# Patient Record
Sex: FEMALE | Race: WHITE | Employment: STUDENT | URBAN - METROPOLITAN AREA
[De-identification: names, ages, dates, MRNs, and addresses within clinical notes are randomized per-mention and may not be internally consistent; named-entity substitution may affect disease eponyms.]

---

## 2019-07-30 ENCOUNTER — OFFICE VISIT (OUTPATIENT)
Dept: PRIMARY CARE CLINIC | Age: 24
End: 2019-07-30
Payer: COMMERCIAL

## 2019-07-30 VITALS
OXYGEN SATURATION: 96 % | BODY MASS INDEX: 26.31 KG/M2 | WEIGHT: 168 LBS | DIASTOLIC BLOOD PRESSURE: 72 MMHG | SYSTOLIC BLOOD PRESSURE: 104 MMHG | HEART RATE: 93 BPM | TEMPERATURE: 97.9 F

## 2019-07-30 DIAGNOSIS — J01.00 ACUTE NON-RECURRENT MAXILLARY SINUSITIS: ICD-10-CM

## 2019-07-30 DIAGNOSIS — R05.9 COUGH: ICD-10-CM

## 2019-07-30 DIAGNOSIS — Z87.440 HISTORY OF RECURRENT UTIS: ICD-10-CM

## 2019-07-30 DIAGNOSIS — F41.9 ANXIETY: ICD-10-CM

## 2019-07-30 DIAGNOSIS — K21.9 GASTROESOPHAGEAL REFLUX DISEASE WITHOUT ESOPHAGITIS: Primary | ICD-10-CM

## 2019-07-30 PROCEDURE — 99214 OFFICE O/P EST MOD 30 MIN: CPT | Performed by: FAMILY MEDICINE

## 2019-07-30 RX ORDER — DAPSONE 75 MG/G
GEL TOPICAL
Refills: 4 | COMMUNITY
Start: 2019-06-24 | End: 2020-02-19 | Stop reason: CLARIF

## 2019-07-30 RX ORDER — AZITHROMYCIN 250 MG/1
TABLET, FILM COATED ORAL
Qty: 1 PACKET | Refills: 0 | Status: SHIPPED | OUTPATIENT
Start: 2019-07-30 | End: 2019-09-07

## 2019-07-30 RX ORDER — DEXLANSOPRAZOLE 60 MG/1
60 CAPSULE, DELAYED RELEASE ORAL DAILY
Qty: 30 CAPSULE | Refills: 5 | Status: SHIPPED | OUTPATIENT
Start: 2019-07-30

## 2019-07-30 RX ORDER — PREDNISONE 1 MG/1
TABLET ORAL
Qty: 30 TABLET | Refills: 0 | Status: SHIPPED | OUTPATIENT
Start: 2019-07-30 | End: 2019-09-07

## 2019-07-30 RX ORDER — SERTRALINE HYDROCHLORIDE 100 MG/1
TABLET, FILM COATED ORAL
Qty: 30 TABLET | Refills: 5 | Status: SHIPPED
Start: 2019-07-30 | End: 2020-08-10 | Stop reason: SDUPTHER

## 2019-07-30 ASSESSMENT — PATIENT HEALTH QUESTIONNAIRE - PHQ9
SUM OF ALL RESPONSES TO PHQ QUESTIONS 1-9: 0
2. FEELING DOWN, DEPRESSED OR HOPELESS: 0
1. LITTLE INTEREST OR PLEASURE IN DOING THINGS: 0
SUM OF ALL RESPONSES TO PHQ QUESTIONS 1-9: 0
SUM OF ALL RESPONSES TO PHQ9 QUESTIONS 1 & 2: 0

## 2019-07-30 ASSESSMENT — ENCOUNTER SYMPTOMS
EYES NEGATIVE: 1
GASTROINTESTINAL NEGATIVE: 1
COUGH: 1
ALLERGIC/IMMUNOLOGIC NEGATIVE: 1
SINUS PRESSURE: 1
SINUS PAIN: 1

## 2019-08-29 PROBLEM — R05.9 COUGH: Status: RESOLVED | Noted: 2019-07-30 | Resolved: 2019-08-29

## 2019-09-07 ENCOUNTER — HOSPITAL ENCOUNTER (EMERGENCY)
Age: 24
Discharge: HOME | End: 2019-09-07
Payer: COMMERCIAL

## 2019-09-07 ENCOUNTER — HOSPITAL ENCOUNTER (EMERGENCY)
Dept: GENERAL RADIOLOGY | Age: 24
Discharge: HOME | End: 2019-09-07
Payer: COMMERCIAL

## 2019-09-07 VITALS
OXYGEN SATURATION: 98 % | TEMPERATURE: 97.6 F | WEIGHT: 165 LBS | SYSTOLIC BLOOD PRESSURE: 119 MMHG | RESPIRATION RATE: 16 BRPM | BODY MASS INDEX: 25.9 KG/M2 | HEART RATE: 78 BPM | HEIGHT: 67 IN | DIASTOLIC BLOOD PRESSURE: 72 MMHG

## 2019-09-07 DIAGNOSIS — S93.401A SPRAIN OF RIGHT ANKLE, UNSPECIFIED LIGAMENT, INITIAL ENCOUNTER: Primary | ICD-10-CM

## 2019-09-07 PROCEDURE — 73630 X-RAY EXAM OF FOOT: CPT

## 2019-09-07 PROCEDURE — 73610 X-RAY EXAM OF ANKLE: CPT

## 2019-09-07 PROCEDURE — 99213 OFFICE O/P EST LOW 20 MIN: CPT

## 2019-09-07 PROCEDURE — 99203 OFFICE O/P NEW LOW 30 MIN: CPT | Performed by: NURSE PRACTITIONER

## 2019-09-07 ASSESSMENT — PAIN SCALES - GENERAL: PAINLEVEL_OUTOF10: 7

## 2019-09-07 ASSESSMENT — PAIN DESCRIPTION - DESCRIPTORS: DESCRIPTORS: ACHING;SORE

## 2019-09-07 ASSESSMENT — ENCOUNTER SYMPTOMS
GASTROINTESTINAL NEGATIVE: 1
COUGH: 0
EYE ITCHING: 0
SHORTNESS OF BREATH: 0
EYE REDNESS: 0
WHEEZING: 0
ALLERGIC/IMMUNOLOGIC NEGATIVE: 1
EYE PAIN: 0
CHEST TIGHTNESS: 0

## 2019-09-07 ASSESSMENT — PAIN DESCRIPTION - PROGRESSION: CLINICAL_PROGRESSION: GRADUALLY WORSENING

## 2019-09-07 ASSESSMENT — PAIN DESCRIPTION - LOCATION: LOCATION: ANKLE

## 2019-09-07 ASSESSMENT — PAIN DESCRIPTION - FREQUENCY: FREQUENCY: CONTINUOUS

## 2019-09-07 ASSESSMENT — PAIN DESCRIPTION - PAIN TYPE: TYPE: ACUTE PAIN

## 2019-09-07 ASSESSMENT — PAIN DESCRIPTION - ONSET: ONSET: SUDDEN

## 2019-09-07 ASSESSMENT — PAIN - FUNCTIONAL ASSESSMENT: PAIN_FUNCTIONAL_ASSESSMENT: PREVENTS OR INTERFERES SOME ACTIVE ACTIVITIES AND ADLS

## 2019-09-07 NOTE — ED TRIAGE NOTES
Pt to [facility] ambulatory with right ankle pain. Pt fell last night. Pt has bruising and swelling to right ankle area.

## 2019-09-07 NOTE — ED PROVIDER NOTES
[address]       Chief Complaint   Patient presents with    Ankle Pain     Right       Nurses Notes reviewed and I agree except as noted in the HPI. HISTORY OF PRESENT ILLNESS   Tahir Charles is a 25 y.o. female who presents The history is provided by the patient. Ankle Problem   Location:  Ankle  Injury: yes    Mechanism of injury: fall    Fall:     Fall occurred: ankle gave way when walking. was wearing wedge shoe. had immedate pain and swelling in the right ankle, and right mid foot. Ankle location:  R ankle  Pain details:     Quality:  Aching, sharp, shooting and throbbing    Radiates to:  Does not radiate    Severity:  Moderate    Onset quality:  Sudden    Duration:  1 day    Timing:  Constant    Progression:  Worsening  Foreign body present:  No foreign bodies  Tetanus status:  Out of date  Prior injury to area:  No  Relieved by:  Nothing  Worsened by: Activity, adduction, abduction, bearing weight and exercise  Ineffective treatments:  None tried  Risk factors: no obesity          REVIEW OF SYSTEMS     Review of Systems   Constitutional: Positive for activity change. HENT: Negative. Eyes: Negative for pain, redness and itching. Respiratory: Negative for cough, chest tightness, shortness of breath and wheezing. Cardiovascular: Negative for chest pain and leg swelling. Gastrointestinal: Negative. Endocrine: Negative for cold intolerance and heat intolerance. Genitourinary: Negative. Musculoskeletal: Positive for arthralgias and myalgias. Skin: Negative. Allergic/Immunologic: Negative. Neurological: Negative for dizziness, light-headedness and headaches. Hematological: Negative for adenopathy. Does not bruise/bleed easily. Psychiatric/Behavioral: Negative. PAST MEDICAL HISTORY   History reviewed. No pertinent past medical history.     SURGICAL HISTORY     Patient  has a past surgical history that

## 2019-09-12 ENCOUNTER — TELEPHONE (OUTPATIENT)
Dept: PRIMARY CARE CLINIC | Age: 24
End: 2019-09-12

## 2019-09-12 NOTE — TELEPHONE ENCOUNTER
Patient does not wish to see anyone at Dr[facility]. Stated they are very rude. Wants to be referred to referred to anyone in Dr[facility] Charles [facility].     Will you please place a referral.

## 2020-02-19 ENCOUNTER — OFFICE VISIT (OUTPATIENT)
Dept: PRIMARY CARE CLINIC | Age: 25
End: 2020-02-19
Payer: COMMERCIAL

## 2020-02-19 ENCOUNTER — HOSPITAL ENCOUNTER (OUTPATIENT)
Age: 25
Discharge: HOME | End: 2020-02-21
Payer: COMMERCIAL

## 2020-02-19 VITALS
DIASTOLIC BLOOD PRESSURE: 78 MMHG | OXYGEN SATURATION: 98 % | HEART RATE: 78 BPM | WEIGHT: 162 LBS | TEMPERATURE: 98.2 F | BODY MASS INDEX: 25.37 KG/M2 | SYSTOLIC BLOOD PRESSURE: 122 MMHG

## 2020-02-19 PROBLEM — R53.83 FATIGUE: Status: ACTIVE | Noted: 2020-02-19

## 2020-02-19 PROBLEM — R61 EXCESSIVE SWEATING: Status: ACTIVE | Noted: 2020-02-19

## 2020-02-19 PROBLEM — R63.1 INCREASED THIRST: Status: ACTIVE | Noted: 2020-02-19

## 2020-02-19 LAB
ALBUMIN SERPL-MCNC: 4.8 G/DL (ref 3.5–5.2)
ALP BLD-CCNC: 55 U/L (ref 35–104)
ALT SERPL-CCNC: 22 U/L (ref 0–32)
ANION GAP SERPL CALCULATED.3IONS-SCNC: 13 MMOL/L (ref 7–16)
AST SERPL-CCNC: 17 U/L (ref 0–31)
BASOPHILS ABSOLUTE: 0.05 E9/L (ref 0–0.2)
BASOPHILS RELATIVE PERCENT: 0.8 % (ref 0–2)
BILIRUB SERPL-MCNC: <0.2 MG/DL (ref 0–1.2)
BUN BLDV-MCNC: 11 MG/DL (ref 6–20)
CALCIUM SERPL-MCNC: 9.7 MG/DL (ref 8.6–10.2)
CHLORIDE BLD-SCNC: 103 MMOL/L (ref 98–107)
CO2: 23 MMOL/L (ref 22–29)
CREAT SERPL-MCNC: 0.8 MG/DL (ref 0.5–1)
EOSINOPHILS ABSOLUTE: 0.19 E9/L (ref 0.05–0.5)
EOSINOPHILS RELATIVE PERCENT: 2.9 % (ref 0–6)
GFR AFRICAN AMERICAN: >60
GFR NON-AFRICAN AMERICAN: >60 ML/MIN/1.73
GLUCOSE BLD-MCNC: 112 MG/DL (ref 74–99)
HCT VFR BLD CALC: 42.5 % (ref 34–48)
HEMOGLOBIN: 14 G/DL (ref 11.5–15.5)
IMMATURE GRANULOCYTES #: 0.02 E9/L
IMMATURE GRANULOCYTES %: 0.3 % (ref 0–5)
LYMPHOCYTES ABSOLUTE: 2.23 E9/L (ref 1.5–4)
LYMPHOCYTES RELATIVE PERCENT: 33.9 % (ref 20–42)
MCH RBC QN AUTO: 29.4 PG (ref 26–35)
MCHC RBC AUTO-ENTMCNC: 32.9 % (ref 32–34.5)
MCV RBC AUTO: 89.3 FL (ref 80–99.9)
MONOCYTES ABSOLUTE: 0.51 E9/L (ref 0.1–0.95)
MONOCYTES RELATIVE PERCENT: 7.8 % (ref 2–12)
NEUTROPHILS ABSOLUTE: 3.57 E9/L (ref 1.8–7.3)
NEUTROPHILS RELATIVE PERCENT: 54.3 % (ref 43–80)
PDW BLD-RTO: 11.8 FL (ref 11.5–15)
PLATELET # BLD: 360 E9/L (ref 130–450)
PMV BLD AUTO: 9.7 FL (ref 7–12)
POTASSIUM SERPL-SCNC: 4 MMOL/L (ref 3.5–5)
RBC # BLD: 4.76 E12/L (ref 3.5–5.5)
SODIUM BLD-SCNC: 139 MMOL/L (ref 132–146)
T4 TOTAL: 8.8 MCG/DL (ref 4.5–11.7)
TOTAL PROTEIN: 7.4 G/DL (ref 6.4–8.3)
TSH SERPL DL<=0.05 MIU/L-ACNC: 0.79 UIU/ML (ref 0.27–4.2)
WBC # BLD: 6.6 E9/L (ref 4.5–11.5)

## 2020-02-19 PROCEDURE — 84443 ASSAY THYROID STIM HORMONE: CPT

## 2020-02-19 PROCEDURE — 80053 COMPREHEN METABOLIC PANEL: CPT

## 2020-02-19 PROCEDURE — 36415 COLL VENOUS BLD VENIPUNCTURE: CPT

## 2020-02-19 PROCEDURE — 86665 EPSTEIN-BARR CAPSID VCA: CPT

## 2020-02-19 PROCEDURE — 99214 OFFICE O/P EST MOD 30 MIN: CPT | Performed by: FAMILY MEDICINE

## 2020-02-19 PROCEDURE — 86664 EPSTEIN-BARR NUCLEAR ANTIGEN: CPT

## 2020-02-19 PROCEDURE — 86663 EPSTEIN-BARR ANTIBODY: CPT

## 2020-02-19 PROCEDURE — 85025 COMPLETE CBC W/AUTO DIFF WBC: CPT

## 2020-02-19 PROCEDURE — 84436 ASSAY OF TOTAL THYROXINE: CPT

## 2020-02-19 ASSESSMENT — ENCOUNTER SYMPTOMS
EYES NEGATIVE: 1
ALLERGIC/IMMUNOLOGIC NEGATIVE: 1
RESPIRATORY NEGATIVE: 1
GASTROINTESTINAL NEGATIVE: 1

## 2020-02-19 ASSESSMENT — PATIENT HEALTH QUESTIONNAIRE - PHQ9
SUM OF ALL RESPONSES TO PHQ9 QUESTIONS 1 & 2: 2
2. FEELING DOWN, DEPRESSED OR HOPELESS: 1
SUM OF ALL RESPONSES TO PHQ QUESTIONS 1-9: 2
1. LITTLE INTEREST OR PLEASURE IN DOING THINGS: 1
SUM OF ALL RESPONSES TO PHQ QUESTIONS 1-9: 2

## 2020-02-19 NOTE — PROGRESS NOTES
dexlansoprazole (DEXILANT) 60 MG CPDR delayed release capsule, Take 1 capsule by mouth daily, Disp: 30 capsule, Rfl: 5    Allergies   Allergen Reactions    Nickel        Social History     Tobacco Use    Smoking status: Never Smoker    Smokeless tobacco: Never Used   Substance Use Topics    Alcohol use: Yes     Comment: socially    Drug use: No      Past Surgical History:   Procedure Laterality Date    COLONOSCOPY      FINGER SURGERY Right 05/2008    PINKY FINGER    FINGER SURGERY Right 05/2013    MIDDLE FINGER    UPPER GASTROINTESTINAL ENDOSCOPY      and lower     Family History   Problem Relation Age of Onset    Cancer Father         SKIN CA    Brain Cancer Other         GRANDMOTHER - BRAIN TUMOR     No past medical history on file. Vitals:    02/19/20 1514   BP: 122/78   Pulse: 78   Temp: 98.2 °F (36.8 °C)   SpO2: 98%   Weight: 162 lb (73.5 kg)     BP Readings from Last 3 Encounters:   02/19/20 122/78   09/07/19 119/72   07/30/19 104/72        Physical Exam  Vitals signs and nursing note reviewed. Constitutional:       Appearance: She is well-developed. HENT:      Head: Normocephalic. Right Ear: External ear normal.      Left Ear: External ear normal.      Nose: Nose normal.   Eyes:      Conjunctiva/sclera: Conjunctivae normal.      Pupils: Pupils are equal, round, and reactive to light. Neck:      Musculoskeletal: Normal range of motion and neck supple. Cardiovascular:      Rate and Rhythm: Normal rate. Pulmonary:      Breath sounds: Normal breath sounds. Abdominal:      General: Bowel sounds are normal.      Palpations: Abdomen is soft. Musculoskeletal: Normal range of motion. Skin:     General: Skin is warm and dry. Neurological:      Mental Status: She is alert and oriented to person, place, and time. Psychiatric:         Behavior: Behavior normal.     Physical exam findings are actually all excellent. No organomegaly noted. Vitals are stable.   We will go ahead with

## 2020-02-21 LAB
EPSTEIN BARR VIRUS NUCLEAR AB IGG: 349 U/ML (ref 0–21.9)
EPSTEIN-BARR EARLY ANTIGEN ANTIBODY: <5 U/ML (ref 0–10.9)
EPSTEIN-BARR VCA IGG: 370 U/ML (ref 0–21.9)
EPSTEIN-BARR VCA IGM: <10 U/ML (ref 0–43.9)

## 2020-02-26 ENCOUNTER — TELEPHONE (OUTPATIENT)
Dept: PRIMARY CARE CLINIC | Age: 25
End: 2020-02-26

## 2020-02-26 ENCOUNTER — OFFICE VISIT (OUTPATIENT)
Dept: PRIMARY CARE CLINIC | Age: 25
End: 2020-02-26
Payer: COMMERCIAL

## 2020-02-26 VITALS
OXYGEN SATURATION: 98 % | TEMPERATURE: 97.8 F | SYSTOLIC BLOOD PRESSURE: 100 MMHG | HEART RATE: 81 BPM | DIASTOLIC BLOOD PRESSURE: 76 MMHG

## 2020-02-26 PROCEDURE — 99214 OFFICE O/P EST MOD 30 MIN: CPT | Performed by: FAMILY MEDICINE

## 2020-02-26 RX ORDER — BUPROPION HYDROCHLORIDE 150 MG/1
150 TABLET ORAL EVERY MORNING
Qty: 30 TABLET | Refills: 3 | Status: SHIPPED
Start: 2020-02-26 | End: 2020-05-18

## 2020-02-26 ASSESSMENT — ENCOUNTER SYMPTOMS
EYES NEGATIVE: 1
ALLERGIC/IMMUNOLOGIC NEGATIVE: 1
GASTROINTESTINAL NEGATIVE: 1
RESPIRATORY NEGATIVE: 1

## 2020-02-26 NOTE — PROGRESS NOTES
BY MOUTH DAILY, Disp: 30 tablet, Rfl: 5    dexlansoprazole (DEXILANT) 60 MG CPDR delayed release capsule, Take 1 capsule by mouth daily, Disp: 30 capsule, Rfl: 5    Allergies   Allergen Reactions    Nickel        Social History     Tobacco Use    Smoking status: Never Smoker    Smokeless tobacco: Never Used   Substance Use Topics    Alcohol use: Yes     Comment: socially    Drug use: No      Past Surgical History:   Procedure Laterality Date    COLONOSCOPY      FINGER SURGERY Right 05/2008    PINKY FINGER    FINGER SURGERY Right 05/2013    MIDDLE FINGER    UPPER GASTROINTESTINAL ENDOSCOPY      and lower     Family History   Problem Relation Age of Onset    Cancer Father         SKIN CA    Brain Cancer Other         GRANDMOTHER - BRAIN TUMOR     No past medical history on file. Vitals:    02/26/20 0800   BP: 100/76   Pulse: 81   Temp: 97.8 °F (36.6 °C)   SpO2: 98%     BP Readings from Last 3 Encounters:   02/26/20 100/76   02/19/20 122/78   09/07/19 119/72        Physical Exam  Vitals signs and nursing note reviewed. Constitutional:       Appearance: She is well-developed. HENT:      Head: Normocephalic. Right Ear: External ear normal.      Left Ear: External ear normal.      Nose: Nose normal.   Eyes:      Conjunctiva/sclera: Conjunctivae normal.      Pupils: Pupils are equal, round, and reactive to light. Neck:      Musculoskeletal: Normal range of motion and neck supple. Cardiovascular:      Rate and Rhythm: Normal rate. Pulmonary:      Breath sounds: Normal breath sounds. Abdominal:      General: Bowel sounds are normal.      Palpations: Abdomen is soft. Musculoskeletal: Normal range of motion. Skin:     General: Skin is warm and dry. Neurological:      Mental Status: She is alert and oriented to person, place, and time. Psychiatric:         Behavior: Behavior normal.     Today's vitals physical examination are all stable. Additional x-ray to be completed.   Adjustment with medications. Reassessment 3 weeks            Plan Per Assessment:  Richard Fine was seen today for discuss labs. Diagnoses and all orders for this visit:    Fatigue, unspecified type  -     XR CHEST STANDARD (2 VW); Future  -     XR ABDOMEN (KUB) (SINGLE AP VIEW); Future    Anxiety    Excessive sweating  -     XR CHEST STANDARD (2 VW); Future  -     XR ABDOMEN (KUB) (SINGLE AP VIEW); Future    Other orders  -     buPROPion (WELLBUTRIN XL) 150 MG extended release tablet; Take 1 tablet by mouth every morning            Return in about 3 weeks (around 3/18/2020). Jo Stephenson DO    Note was generated with the assistance of voice recognition software. Document was reviewed however may contain grammatical errors.

## 2020-03-11 ENCOUNTER — TELEPHONE (OUTPATIENT)
Dept: PEDIATRICS CLINIC | Age: 25
End: 2020-03-11

## 2020-03-11 NOTE — TELEPHONE ENCOUNTER
End of Feb was changed from Zoloft to Wellbutrin XL 150mg. Started noticing side effects, eyes shaking, tachycardia, emotional, couldn't focus and had to get sent home from work. Started back on 100 mg of Zoloft on Friday. 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 Okay to continue on Zoloft and okay to cancel appt ?

## 2020-08-10 RX ORDER — SERTRALINE HYDROCHLORIDE 100 MG/1
TABLET, FILM COATED ORAL
Qty: 90 TABLET | Refills: 1 | Status: SHIPPED | OUTPATIENT
Start: 2020-08-10

## 2023-10-16 PROBLEM — R53.83 MALAISE AND FATIGUE: Status: ACTIVE | Noted: 2023-10-16

## 2023-10-16 PROBLEM — R61 UNEXPLAINED NIGHT SWEATS: Status: ACTIVE | Noted: 2023-10-16

## 2023-10-16 PROBLEM — K21.9 GERD (GASTROESOPHAGEAL REFLUX DISEASE): Status: ACTIVE | Noted: 2023-10-16

## 2023-10-16 PROBLEM — R23.3 PETECHIAE: Status: ACTIVE | Noted: 2023-10-16

## 2023-10-16 PROBLEM — F41.9 ANXIETY: Status: ACTIVE | Noted: 2023-10-16

## 2023-10-16 PROBLEM — R53.81 MALAISE AND FATIGUE: Status: ACTIVE | Noted: 2023-10-16

## 2023-10-16 PROBLEM — R87.610 ATYPICAL SQUAMOUS CELL OF UNDETERMINED SIGNIFICANCE OF CERVIX: Status: ACTIVE | Noted: 2023-10-16

## 2023-10-16 PROBLEM — B97.7 HPV IN FEMALE: Status: ACTIVE | Noted: 2023-10-16

## 2023-10-16 RX ORDER — SERTRALINE HYDROCHLORIDE 100 MG/1
100 TABLET, FILM COATED ORAL DAILY
COMMUNITY
End: 2024-04-09 | Stop reason: SDUPTHER

## 2023-10-16 RX ORDER — FLUCONAZOLE 150 MG/1
TABLET ORAL
COMMUNITY
Start: 2023-01-30 | End: 2023-10-17 | Stop reason: WASHOUT

## 2023-10-16 RX ORDER — LEVONORGESTREL/ETHIN.ESTRADIOL 0.1-0.02MG
1 TABLET ORAL DAILY
COMMUNITY
Start: 2022-05-12 | End: 2023-10-17 | Stop reason: WASHOUT

## 2023-10-16 RX ORDER — OMEPRAZOLE 20 MG/1
CAPSULE, DELAYED RELEASE ORAL
COMMUNITY
Start: 2021-09-07

## 2023-10-16 RX ORDER — TRIAMCINOLONE ACETONIDE 1 MG/G
OINTMENT TOPICAL
COMMUNITY
Start: 2023-04-18 | End: 2023-10-17 | Stop reason: WASHOUT

## 2023-10-17 ENCOUNTER — OFFICE VISIT (OUTPATIENT)
Dept: OBSTETRICS AND GYNECOLOGY | Facility: CLINIC | Age: 28
End: 2023-10-17
Payer: COMMERCIAL

## 2023-10-17 VITALS
SYSTOLIC BLOOD PRESSURE: 128 MMHG | WEIGHT: 177 LBS | HEART RATE: 71 BPM | BODY MASS INDEX: 26.83 KG/M2 | DIASTOLIC BLOOD PRESSURE: 77 MMHG | HEIGHT: 68 IN

## 2023-10-17 DIAGNOSIS — Z12.4 SCREENING FOR MALIGNANT NEOPLASM OF CERVIX: Primary | ICD-10-CM

## 2023-10-17 DIAGNOSIS — Z01.411 ENCNTR FOR GYN EXAM (GENERAL) (ROUTINE) W ABNORMAL FINDINGS: ICD-10-CM

## 2023-10-17 DIAGNOSIS — N89.8 VAGINAL DISCHARGE: ICD-10-CM

## 2023-10-17 PROCEDURE — 87800 DETECT AGNT MULT DNA DIREC: CPT | Mod: CMCLAB | Performed by: NURSE PRACTITIONER

## 2023-10-17 PROCEDURE — 1036F TOBACCO NON-USER: CPT | Performed by: NURSE PRACTITIONER

## 2023-10-17 PROCEDURE — 88175 CYTOPATH C/V AUTO FLUID REDO: CPT | Mod: TC,GCY | Performed by: NURSE PRACTITIONER

## 2023-10-17 PROCEDURE — 87661 TRICHOMONAS VAGINALIS AMPLIF: CPT | Mod: 59 | Performed by: NURSE PRACTITIONER

## 2023-10-17 PROCEDURE — 87205 SMEAR GRAM STAIN: CPT | Mod: CMCLAB | Performed by: NURSE PRACTITIONER

## 2023-10-17 PROCEDURE — 99395 PREV VISIT EST AGE 18-39: CPT | Performed by: NURSE PRACTITIONER

## 2023-10-17 PROCEDURE — 87624 HPV HI-RISK TYP POOLED RSLT: CPT | Mod: 59 | Performed by: NURSE PRACTITIONER

## 2023-10-17 PROCEDURE — 87181 SC STD AGAR DILUTION PER AGT: CPT | Performed by: NURSE PRACTITIONER

## 2023-10-17 PROCEDURE — 88141 CYTOPATH C/V INTERPRET: CPT | Performed by: PATHOLOGY

## 2023-10-17 ASSESSMENT — COLUMBIA-SUICIDE SEVERITY RATING SCALE - C-SSRS
2. HAVE YOU ACTUALLY HAD ANY THOUGHTS OF KILLING YOURSELF?: NO
1. IN THE PAST MONTH, HAVE YOU WISHED YOU WERE DEAD OR WISHED YOU COULD GO TO SLEEP AND NOT WAKE UP?: NO
6. HAVE YOU EVER DONE ANYTHING, STARTED TO DO ANYTHING, OR PREPARED TO DO ANYTHING TO END YOUR LIFE?: NO

## 2023-10-17 ASSESSMENT — PATIENT HEALTH QUESTIONNAIRE - PHQ9
SUM OF ALL RESPONSES TO PHQ9 QUESTIONS 1 AND 2: 0
2. FEELING DOWN, DEPRESSED OR HOPELESS: NOT AT ALL
1. LITTLE INTEREST OR PLEASURE IN DOING THINGS: NOT AT ALL

## 2023-10-17 ASSESSMENT — ENCOUNTER SYMPTOMS
NEUROLOGICAL NEGATIVE: 0
CONSTITUTIONAL NEGATIVE: 0
MUSCULOSKELETAL NEGATIVE: 0
HEMATOLOGIC/LYMPHATIC NEGATIVE: 0
CARDIOVASCULAR NEGATIVE: 0
EYES NEGATIVE: 0
ALLERGIC/IMMUNOLOGIC NEGATIVE: 0
RESPIRATORY NEGATIVE: 0
GASTROINTESTINAL NEGATIVE: 0
ENDOCRINE NEGATIVE: 0
PSYCHIATRIC NEGATIVE: 0

## 2023-10-17 NOTE — PROGRESS NOTES
"Sultana Singer is a 28 y.o. who presents today for her annual gynecologic exam without complaints    Concerns with intercourse:  no     Last pap:   2022 ASCUS HPV Positive other  History of abnormal pap: yes - 2021 BENJI 1  HPV vaccine: yes -    Last mammogram: Never  Colon screen: 2017    History of STIs: no    Patient concern for STI: yes - yes    Family history of breast, uterine, ovarian or colon cancer: yes - GF colon      Past medical, surgical, family and social histories reviewed and updated as needed.    /77   Pulse 71   Ht 1.727 m (5' 8\")   Wt 80.3 kg (177 lb)   BMI 26.91 kg/m²      Physical Exam  Constitutional:       Appearance: Normal appearance.   Genitourinary:      Bladder and rectum normal.      Right Labia: No skin changes or Bartholin's cyst.     Left Labia: No skin changes or Bartholin's cyst.     Vulva exam comments: Normal.      No vaginal prolapse present.     No vaginal atrophy present.     Vaginal exam comments: Normal.      No cervical motion tenderness.      No IUD strings visualized (Cut to just inside os).      Cervical exam comments: Normal.   Breasts:     Breasts are soft.     Right: Normal.      Left: Normal.   HENT:      Head: Normocephalic.   Pulmonary:      Effort: Pulmonary effort is normal.   Abdominal:      General: There is no distension.      Palpations: Abdomen is soft.   Musculoskeletal:         General: Normal range of motion.      Cervical back: Normal range of motion and neck supple.   Neurological:      General: No focal deficit present.      Mental Status: She is alert and oriented to person, place, and time.   Skin:     General: Skin is warm and dry.   Psychiatric:         Mood and Affect: Mood normal.         Behavior: Behavior normal.         Thought Content: Thought content normal.         Judgment: Judgment normal.   Vitals and nursing note reviewed.            Diagnoses and all orders for this visit:  Screening for malignant neoplasm of cervix  -     " THINPREP PAP  Encntr for gyn exam (general) (routine) w abnormal findings  Vaginal discharge  -     Vaginitis Gram Stain For Bacterial Vaginosis + Yeast  -     Fungal Culture/Smear

## 2023-10-18 LAB
CLUE CELLS VAG LPF-#/AREA: ABNORMAL /[LPF]
NUGENT SCORE: 1
YEAST VAG WET PREP-#/AREA: PRESENT

## 2023-10-19 DIAGNOSIS — B37.9 YEAST INFECTION: Primary | ICD-10-CM

## 2023-10-19 LAB
C TRACH RRNA SPEC QL NAA+PROBE: NEGATIVE
N GONORRHOEA DNA SPEC QL PROBE+SIG AMP: NEGATIVE
T VAGINALIS RRNA SPEC QL NAA+PROBE: NEGATIVE

## 2023-10-19 RX ORDER — FLUCONAZOLE 150 MG/1
150 TABLET ORAL
Qty: 2 TABLET | Refills: 1 | Status: SHIPPED | OUTPATIENT
Start: 2023-10-19

## 2023-10-20 ENCOUNTER — PHARMACY VISIT (OUTPATIENT)
Dept: PHARMACY | Facility: CLINIC | Age: 28
End: 2023-10-20
Payer: COMMERCIAL

## 2023-10-20 RX ORDER — FOLLITROPIN 300 [IU]/.36ML
INJECTION, SOLUTION SUBCUTANEOUS
Qty: 0.42 ML | Refills: 0 | OUTPATIENT
Start: 2023-10-20 | End: 2023-10-20

## 2023-10-24 LAB
FUNGUS SPEC CULT: ABNORMAL
FUNGUS SPEC FUNGUS STN: ABNORMAL

## 2023-11-01 LAB
CYTOLOGY CMNT CVX/VAG CYTO-IMP: NORMAL
HPV HR GENOTYPES PNL CVX NAA+PROBE: NEGATIVE
LAB AP CONTRACEPTIVE HISTORY: NORMAL
LAB AP HPV GENOTYPE QUESTION: NO
LAB AP HPV HR: NORMAL
LAB AP PAP ADDITIONAL TESTS: NORMAL
LABORATORY COMMENT REPORT: NORMAL
LMP START DATE: NORMAL
PATH REPORT.TOTAL CANCER: NORMAL

## 2023-11-02 PROBLEM — K21.9 GASTROESOPHAGEAL REFLUX DISEASE WITHOUT ESOPHAGITIS: Status: ACTIVE | Noted: 2019-07-30

## 2024-01-14 NOTE — PROGRESS NOTES
IUD Removal Procedure Note    Type of IUD:   Kyleena  Date of insertion:  unknown  Reason for removal:   recurrent yeast infections, desires OCP  Other relevant history/information:  none    Procedure Details  IUD strings visible:  no  Local anesthesia:  None  Tenaculum used:  None  Removal:  An alligator forceps was entered through the cervical os after the cervix and vagina were prepped.  The IUD was grasped and removed intact.  2 attempt(s) were needed for successful removal.  The patient tolerated the procedure well.    All appropriate instructions regarding removal were reviewed.    Plans for contraception:  oral contraceptives (estrogen/progesterone)    Other follow-up needed:  none    The patient was advised to call for any fever or for prolonged or severe pain or bleeding. She was advised to use OTC ibuprofen as needed for mild to moderate pain.     NARDA Sapp

## 2024-01-15 ENCOUNTER — OFFICE VISIT (OUTPATIENT)
Dept: OBSTETRICS AND GYNECOLOGY | Facility: CLINIC | Age: 29
End: 2024-01-15
Payer: COMMERCIAL

## 2024-01-15 VITALS
HEIGHT: 68 IN | WEIGHT: 175 LBS | DIASTOLIC BLOOD PRESSURE: 79 MMHG | SYSTOLIC BLOOD PRESSURE: 117 MMHG | BODY MASS INDEX: 26.52 KG/M2 | HEART RATE: 73 BPM

## 2024-01-15 DIAGNOSIS — Z30.432 ENCOUNTER FOR IUD REMOVAL: Primary | ICD-10-CM

## 2024-01-15 DIAGNOSIS — Z30.011 BCP (BIRTH CONTROL PILLS) INITIATION: ICD-10-CM

## 2024-01-15 PROCEDURE — 58301 REMOVE INTRAUTERINE DEVICE: CPT | Performed by: ADVANCED PRACTICE MIDWIFE

## 2024-01-15 PROCEDURE — 1036F TOBACCO NON-USER: CPT | Performed by: ADVANCED PRACTICE MIDWIFE

## 2024-01-15 RX ORDER — LEVONORGESTREL/ETHIN.ESTRADIOL 0.1-0.02MG
1 TABLET ORAL DAILY
Qty: 90 TABLET | Refills: 4 | Status: SHIPPED | OUTPATIENT
Start: 2024-01-15 | End: 2024-04-14

## 2024-01-15 ASSESSMENT — ENCOUNTER SYMPTOMS
ALLERGIC/IMMUNOLOGIC NEGATIVE: 0
HEMATOLOGIC/LYMPHATIC NEGATIVE: 0
GASTROINTESTINAL NEGATIVE: 0
EYES NEGATIVE: 0
PSYCHIATRIC NEGATIVE: 0
MUSCULOSKELETAL NEGATIVE: 0
ENDOCRINE NEGATIVE: 0
CARDIOVASCULAR NEGATIVE: 0
RESPIRATORY NEGATIVE: 0
CONSTITUTIONAL NEGATIVE: 0
NEUROLOGICAL NEGATIVE: 0

## 2024-01-15 ASSESSMENT — PAIN SCALES - GENERAL: PAINLEVEL: 0-NO PAIN

## 2024-01-23 ENCOUNTER — APPOINTMENT (OUTPATIENT)
Dept: BEHAVIORAL HEALTH | Facility: CLINIC | Age: 29
End: 2024-01-23
Payer: COMMERCIAL

## 2024-03-29 DIAGNOSIS — B37.9 CANDIDIASIS: Primary | ICD-10-CM

## 2024-03-29 RX ORDER — FLUCONAZOLE 100 MG/1
150 TABLET ORAL DAILY
Qty: 3 TABLET | Refills: 0 | Status: SHIPPED | OUTPATIENT
Start: 2024-03-29 | End: 2024-03-31

## 2024-04-09 ENCOUNTER — TELEMEDICINE (OUTPATIENT)
Dept: BEHAVIORAL HEALTH | Facility: CLINIC | Age: 29
End: 2024-04-09
Payer: COMMERCIAL

## 2024-04-09 DIAGNOSIS — F41.9 ANXIETY: ICD-10-CM

## 2024-04-09 PROCEDURE — 99214 OFFICE O/P EST MOD 30 MIN: CPT | Performed by: PSYCHIATRY & NEUROLOGY

## 2024-04-09 RX ORDER — SERTRALINE HYDROCHLORIDE 100 MG/1
100 TABLET, FILM COATED ORAL DAILY
Qty: 90 TABLET | Refills: 1 | Status: SHIPPED | OUTPATIENT
Start: 2024-04-09

## 2024-04-09 NOTE — PROGRESS NOTES
"Subjective   Patient ID: Yakelin Singer is a 28 y.o. female.    HPI  The encounter diagnosis was Anxiety.      Current Outpatient Medications:     fluconazole (Diflucan) 150 mg tablet, Take 1 tablet (150 mg) by mouth every 3rd day. 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    GERD (gastroesophageal reflux disease)    HPV in female    Malaise and fatigue    Petechiae    Unexplained night sweats    Gastroesophageal reflux disease without esophagitis       Sleep--\"sleeping better\"  -- uses melatonin;  Physical pain--  back pain---  has improved;  Presently engaged in many health related exercise behaviors;  Work--- stressful at certain points prior to 2024;    Busy-- at certain points more than others, but managing;  Mood--  monitoring and insight into changes- whether medical or due to situational stressors;      Review of Systems   Psychiatric/Behavioral:  Negative for dysphoric mood and suicidal ideas.        Objective   Physical Exam  Psychiatric:         Mood and Affect: Mood and affect normal.         Speech: Speech normal.         Behavior: Behavior is not agitated.         Thought Content: Thought content is not paranoid. Thought content does not include homicidal or suicidal ideation.         Assessment/Plan   Diagnoses and all orders for this visit:  Anxiety  -     sertraline (Zoloft) 100 mg tablet; Take 1 tablet (100 mg) by mouth once daily.  -     Follow Up In Psychiatry; Future        "

## 2024-04-09 NOTE — PATIENT INSTRUCTIONS
Keep next scheduled follow up visit;  ---after business hours and on weekends: call [phone] to reach the answering service  ---for appointments and medication refills and any questions or concerns call [phone]  ---if you run out of your medication or need more refills between visits, call our office: [phone]  ---if you need immediate assistance or in case of emergency, dial 911 or go to the nearest emergency room   ---discussed Risks, benefits, side effects and alternative treatments today and came up with a treatment plan

## 2024-04-21 ASSESSMENT — ENCOUNTER SYMPTOMS: DYSPHORIC MOOD: 0

## 2024-07-10 ENCOUNTER — TELEPHONE (OUTPATIENT)
Dept: PRIMARY CARE | Facility: CLINIC | Age: 29
End: 2024-07-10
Payer: COMMERCIAL

## 2024-07-13 ENCOUNTER — OFFICE VISIT (OUTPATIENT)
Dept: DERMATOLOGY | Facility: CLINIC | Age: 29
End: 2024-07-13
Payer: COMMERCIAL

## 2024-07-13 DIAGNOSIS — L30.9 DERMATITIS: ICD-10-CM

## 2024-07-13 DIAGNOSIS — I78.1 NEVUS, NON-NEOPLASTIC: ICD-10-CM

## 2024-07-13 DIAGNOSIS — D48.9 NEOPLASM OF UNCERTAIN BEHAVIOR: Primary | ICD-10-CM

## 2024-07-13 DIAGNOSIS — S90.211A SUBUNGUAL HEMATOMA OF GREAT TOE OF RIGHT FOOT, INITIAL ENCOUNTER: ICD-10-CM

## 2024-07-13 PROCEDURE — 99204 OFFICE O/P NEW MOD 45 MIN: CPT | Performed by: NURSE PRACTITIONER

## 2024-07-13 RX ORDER — TACROLIMUS 1 MG/G
OINTMENT TOPICAL 2 TIMES DAILY
Qty: 60 G | Refills: 11 | Status: SHIPPED | OUTPATIENT
Start: 2024-07-13 | End: 2025-07-13

## 2024-07-13 NOTE — PROGRESS NOTES
Subjective     Yakelin Singer is a 28 y.o. female who presents for the following: Suspicious Skin Lesion (Located left rib cage) and Rash (Nickel Allergy. Uses Tacrolimus for  the flare ups needs refill).     Pt reports lesion on left rib cage was flat and now has become raised and irritated.     Pt questioned regarding discoloration on R great toe nail. She reports it was not there in the beginning of June before her [place] trip and she just removed her nail polish this am and seen the discoloration. She does not recall trauma to the nail     Review of Systems:  No other skin or systemic complaints other than what is documented elsewhere in the note.    The following portions of the chart were reviewed this encounter and updated as appropriate:          Skin Cancer History  No skin cancer on file.      Specialty Problems    None       Objective   Well appearing patient in no apparent distress; mood and affect are within normal limits.    A focused skin examination was performed. All findings within normal limits unless otherwise noted below.    Assessment/Plan   1. Neoplasm of uncertain behavior  Left Flank  Pink elevated papule with irritation measuring 0.4x0.4cm    Specimen 1 - Dermatopathology- DERM LAB  Differential Diagnosis: dermal nevus vs dn vs mm   Check Margins Yes/No?:    Comments:    Dermpath Lab: Routine Histopathology (formalin-fixed tissue)    Discussed bx to r/o DN vs MM  Pt agrees to proceed with bx    2. Nevus, non-neoplastic (5)  Chest (Upper Torso, Anterior), Left Arm, Left Leg, Right Arm, Right Leg  Brown macules with regular boarders and symmetry c/w nevi    All nevi appear normal, no concerning characteristics to warrant a biopsy today     3. Subungual hematoma of great toe of right foot, initial encounter  Right Foot - Anterior  Right great toe with what appears to be a subungal hematoma involving the R medial corner. Pictures obtained today    Pictures obtained today in clinic  Pt encouraged  to take pictures on her phone. Monitor site for growth over the next couple of weeks. If discoloration is not growing out and continues to expand on the the cuticle/skin pt should return to clinic for nail biopsy.     4. Dermatitis  Left Axilla, Right Axilla  No erythematous patches on exam today    Pt reports patches develop randomly. Pt applies tacrolimus which resolves symptoms. Pt has hx of striae from high potency topical steroid use when she was younger.

## 2024-07-17 LAB
LABORATORY COMMENT REPORT: NORMAL
PATH REPORT.FINAL DX SPEC: NORMAL
PATH REPORT.GROSS SPEC: NORMAL
PATH REPORT.MICROSCOPIC SPEC OTHER STN: NORMAL
PATH REPORT.RELEVANT HX SPEC: NORMAL
PATH REPORT.TOTAL CANCER: NORMAL

## 2024-07-24 NOTE — RESULT ENCOUNTER NOTE
"Patient had seen/read \"Result Notes\".  Left a VM with results of shave biopsy of Left Flank: Dermal Nevus.  Per Sanjuanita TOMLIN-CNP results are benign and no further treatment is necessary.  I did, however, ask if she has seen growth of her toe nail to please give me a call back on the nurse line; gave patient number."

## 2024-07-25 NOTE — RESULT ENCOUNTER NOTE
Patient returned call and left a VM.  Patient stated that she understood benign results and informed us that she has had no changes with her toe nail.  No concerns at this time was noted by patient.

## 2024-09-09 ENCOUNTER — APPOINTMENT (OUTPATIENT)
Dept: PRIMARY CARE | Facility: CLINIC | Age: 29
End: 2024-09-09
Payer: COMMERCIAL

## 2024-09-24 ENCOUNTER — APPOINTMENT (OUTPATIENT)
Dept: BEHAVIORAL HEALTH | Facility: CLINIC | Age: 29
End: 2024-09-24
Payer: COMMERCIAL

## 2024-10-25 ENCOUNTER — APPOINTMENT (OUTPATIENT)
Dept: PRIMARY CARE | Facility: CLINIC | Age: 29
End: 2024-10-25
Payer: COMMERCIAL

## 2024-10-25 ENCOUNTER — LAB (OUTPATIENT)
Dept: LAB | Facility: LAB | Age: 29
End: 2024-10-25
Payer: COMMERCIAL

## 2024-10-25 VITALS
RESPIRATION RATE: 18 BRPM | SYSTOLIC BLOOD PRESSURE: 120 MMHG | DIASTOLIC BLOOD PRESSURE: 87 MMHG | OXYGEN SATURATION: 98 % | WEIGHT: 178 LBS | BODY MASS INDEX: 26.98 KG/M2 | HEIGHT: 68 IN | HEART RATE: 89 BPM | TEMPERATURE: 98 F

## 2024-10-25 DIAGNOSIS — F41.9 ANXIETY: ICD-10-CM

## 2024-10-25 DIAGNOSIS — Z00.00 HEALTH CARE MAINTENANCE: ICD-10-CM

## 2024-10-25 DIAGNOSIS — R61 UNEXPLAINED NIGHT SWEATS: ICD-10-CM

## 2024-10-25 DIAGNOSIS — E55.9 VITAMIN D DEFICIENCY: ICD-10-CM

## 2024-10-25 DIAGNOSIS — Z00.00 HEALTH CARE MAINTENANCE: Primary | ICD-10-CM

## 2024-10-25 DIAGNOSIS — K21.9 GASTROESOPHAGEAL REFLUX DISEASE, UNSPECIFIED WHETHER ESOPHAGITIS PRESENT: ICD-10-CM

## 2024-10-25 LAB
25(OH)D3 SERPL-MCNC: 87 NG/ML (ref 30–100)
ALBUMIN SERPL BCP-MCNC: 4.7 G/DL (ref 3.4–5)
ALP SERPL-CCNC: 47 U/L (ref 33–110)
ALT SERPL W P-5'-P-CCNC: 19 U/L (ref 7–45)
ANION GAP SERPL CALC-SCNC: 14 MMOL/L (ref 10–20)
AST SERPL W P-5'-P-CCNC: 15 U/L (ref 9–39)
BASOPHILS # BLD AUTO: 0.04 X10*3/UL (ref 0–0.1)
BASOPHILS NFR BLD AUTO: 0.6 %
BILIRUB SERPL-MCNC: 0.6 MG/DL (ref 0–1.2)
BUN SERPL-MCNC: 13 MG/DL (ref 6–23)
CALCIUM SERPL-MCNC: 10.2 MG/DL (ref 8.6–10.6)
CHLORIDE SERPL-SCNC: 103 MMOL/L (ref 98–107)
CHOLEST SERPL-MCNC: 213 MG/DL (ref 0–199)
CHOLESTEROL/HDL RATIO: 3
CO2 SERPL-SCNC: 26 MMOL/L (ref 21–32)
CREAT SERPL-MCNC: 0.82 MG/DL (ref 0.5–1.05)
EGFRCR SERPLBLD CKD-EPI 2021: >90 ML/MIN/1.73M*2
EOSINOPHIL # BLD AUTO: 0.14 X10*3/UL (ref 0–0.7)
EOSINOPHIL NFR BLD AUTO: 2.2 %
ERYTHROCYTE [DISTWIDTH] IN BLOOD BY AUTOMATED COUNT: 12.2 % (ref 11.5–14.5)
EST. AVERAGE GLUCOSE BLD GHB EST-MCNC: 85 MG/DL
GLUCOSE SERPL-MCNC: 89 MG/DL (ref 74–99)
HBA1C MFR BLD: 4.6 %
HCT VFR BLD AUTO: 44.6 % (ref 36–46)
HDLC SERPL-MCNC: 70.9 MG/DL
HGB BLD-MCNC: 14.3 G/DL (ref 12–16)
IMM GRANULOCYTES # BLD AUTO: 0.02 X10*3/UL (ref 0–0.7)
IMM GRANULOCYTES NFR BLD AUTO: 0.3 % (ref 0–0.9)
LDLC SERPL CALC-MCNC: 125 MG/DL
LYMPHOCYTES # BLD AUTO: 1.89 X10*3/UL (ref 1.2–4.8)
LYMPHOCYTES NFR BLD AUTO: 29.5 %
MCH RBC QN AUTO: 29 PG (ref 26–34)
MCHC RBC AUTO-ENTMCNC: 32.1 G/DL (ref 32–36)
MCV RBC AUTO: 91 FL (ref 80–100)
MONOCYTES # BLD AUTO: 0.37 X10*3/UL (ref 0.1–1)
MONOCYTES NFR BLD AUTO: 5.8 %
NEUTROPHILS # BLD AUTO: 3.95 X10*3/UL (ref 1.2–7.7)
NEUTROPHILS NFR BLD AUTO: 61.6 %
NON HDL CHOLESTEROL: 142 MG/DL (ref 0–149)
NRBC BLD-RTO: 0 /100 WBCS (ref 0–0)
PLATELET # BLD AUTO: 401 X10*3/UL (ref 150–450)
POC APPEARANCE, URINE: CLEAR
POC BILIRUBIN, URINE: NEGATIVE
POC BLOOD, URINE: NEGATIVE
POC COLOR, URINE: YELLOW
POC GLUCOSE, URINE: NEGATIVE MG/DL
POC KETONES, URINE: NEGATIVE MG/DL
POC LEUKOCYTES, URINE: NEGATIVE
POC NITRITE,URINE: NEGATIVE
POC PH, URINE: 7 PH
POC PROTEIN, URINE: NEGATIVE MG/DL
POC SPECIFIC GRAVITY, URINE: 1.01
POC UROBILINOGEN, URINE: 0.2 EU/DL
POTASSIUM SERPL-SCNC: 4.6 MMOL/L (ref 3.5–5.3)
PROT SERPL-MCNC: 7.6 G/DL (ref 6.4–8.2)
RBC # BLD AUTO: 4.93 X10*6/UL (ref 4–5.2)
SODIUM SERPL-SCNC: 138 MMOL/L (ref 136–145)
TRIGL SERPL-MCNC: 88 MG/DL (ref 0–149)
TSH SERPL-ACNC: 1.09 MIU/L (ref 0.44–3.98)
VLDL: 18 MG/DL (ref 0–40)
WBC # BLD AUTO: 6.4 X10*3/UL (ref 4.4–11.3)

## 2024-10-25 PROCEDURE — 80053 COMPREHEN METABOLIC PANEL: CPT

## 2024-10-25 PROCEDURE — 36415 COLL VENOUS BLD VENIPUNCTURE: CPT

## 2024-10-25 PROCEDURE — 80061 LIPID PANEL: CPT

## 2024-10-25 PROCEDURE — 82306 VITAMIN D 25 HYDROXY: CPT

## 2024-10-25 PROCEDURE — 84443 ASSAY THYROID STIM HORMONE: CPT

## 2024-10-25 PROCEDURE — 85025 COMPLETE CBC W/AUTO DIFF WBC: CPT

## 2024-10-25 PROCEDURE — 1036F TOBACCO NON-USER: CPT | Performed by: FAMILY MEDICINE

## 2024-10-25 PROCEDURE — 3008F BODY MASS INDEX DOCD: CPT | Performed by: FAMILY MEDICINE

## 2024-10-25 PROCEDURE — 81002 URINALYSIS NONAUTO W/O SCOPE: CPT | Performed by: FAMILY MEDICINE

## 2024-10-25 PROCEDURE — 83036 HEMOGLOBIN GLYCOSYLATED A1C: CPT

## 2024-10-25 PROCEDURE — 99395 PREV VISIT EST AGE 18-39: CPT | Performed by: FAMILY MEDICINE

## 2024-10-25 RX ORDER — SERTRALINE HYDROCHLORIDE 100 MG/1
100 TABLET, FILM COATED ORAL DAILY
Qty: 90 TABLET | Refills: 3 | Status: SHIPPED | OUTPATIENT
Start: 2024-10-25

## 2024-10-25 ASSESSMENT — PATIENT HEALTH QUESTIONNAIRE - PHQ9
SUM OF ALL RESPONSES TO PHQ9 QUESTIONS 1 AND 2: 0
1. LITTLE INTEREST OR PLEASURE IN DOING THINGS: NOT AT ALL
2. FEELING DOWN, DEPRESSED OR HOPELESS: NOT AT ALL

## 2024-10-25 ASSESSMENT — ENCOUNTER SYMPTOMS
SHORTNESS OF BREATH: 0
HEADACHES: 0

## 2024-10-25 NOTE — PROGRESS NOTES
"Luis Singer \"Yakelin\" is a 29 y.o. female who presents for Annual Exam.    HPI     Pt is here today for annual well exam.    She is new to me.  She is a pharmacist for .      She gets her paps yearly through  GYN, hx of abnormal pap.     She reports history of episodic night sweats which have been present for years.  She takes OCP through her GYN.  No unintentional weight loss, fevers, chills.      Review of Systems   Respiratory:  Negative for shortness of breath.    Cardiovascular:  Negative for chest pain.   Neurological:  Negative for headaches.       Objective     Vitals:    10/25/24 1141   BP: 120/87   BP Location: Left arm   Patient Position: Sitting   Pulse: 89   Resp: 18   Temp: 36.7 °C (98 °F)   TempSrc: Temporal   SpO2: 98%   Weight: 80.7 kg (178 lb)   Height: 1.727 m (5' 8\")        Current Outpatient Medications   Medication Instructions    levonorgestreL-ethinyl estrad (Larissia) 0.1-20 mg-mcg tablet 1 tablet, oral, Daily    omeprazole (PriLOSEC) 20 mg DR capsule Omeprazole 20 MG Oral Capsule Delayed Release  Refills: 0  Start: 7-Sep-2021    sertraline (ZOLOFT) 100 mg, oral, Daily    tacrolimus (Protopic) 0.1 % ointment Topical, 2 times daily        No Known Allergies     Past Surgical History:   Procedure Laterality Date    OTHER SURGICAL HISTORY  09/07/2021    Finger surgical procedure    TONSILLECTOMY          Social History     Tobacco Use    Smoking status: Never    Smokeless tobacco: Never   Vaping Use    Vaping status: Never Used   Substance Use Topics    Alcohol use: Yes     Alcohol/week: 3.0 standard drinks of alcohol     Types: 3 Standard drinks or equivalent per week    Drug use: Never        Family History   Problem Relation Name Age of Onset    No Known Problems Mother      Atrial fibrillation Father      Hypertension Father      Hyperlipidemia Father      Colon cancer Paternal Grandmother      Other (cva) Other grandparent     Diabetes Other grandparent     " Hypertension Other grandparent         Immunization History   Administered Date(s) Administered    Hepatitis A vaccine, age 19 years and greater (HAVRIX) 06/30/2017, 12/28/2017    Influenza, Unspecified 10/13/2015, 09/01/2021, 09/19/2023    Influenza, seasonal, injectable 10/13/2015    Meningococcal ACWY-D (Menactra) 4-valent conjugate vaccine 06/10/2014    Meningococcal MCV4, Unspecified 06/10/2014    Moderna SARS-CoV-2 Vaccination 01/05/2021, 02/02/2021    SARS-CoV-2, Unspecified 01/01/2021, 02/01/2021    Tdap vaccine, age 7 year and older (BOOSTRIX, ADACEL) 12/27/2017        Physical Exam  Vitals reviewed.   Constitutional:       General: She is not in acute distress.     Appearance: Normal appearance. She is not ill-appearing.   HENT:      Head: Normocephalic and atraumatic.      Right Ear: Tympanic membrane, ear canal and external ear normal.      Left Ear: Tympanic membrane, ear canal and external ear normal.      Mouth/Throat:      Mouth: Mucous membranes are moist.      Pharynx: No oropharyngeal exudate or posterior oropharyngeal erythema.   Neck:      Thyroid: No thyroid mass or thyromegaly.   Cardiovascular:      Rate and Rhythm: Normal rate and regular rhythm.      Heart sounds: No murmur heard.  Pulmonary:      Effort: No respiratory distress.      Breath sounds: Normal breath sounds. No wheezing, rhonchi or rales.   Abdominal:      General: There is no distension.      Palpations: Abdomen is soft.      Tenderness: There is no abdominal tenderness.   Lymphadenopathy:      Cervical: No cervical adenopathy.   Skin:     General: Skin is warm and dry.      Findings: No rash.   Neurological:      Mental Status: She is alert and oriented to person, place, and time. Mental status is at baseline.   Psychiatric:         Mood and Affect: Mood normal.         Behavior: Behavior normal.         Assessment & Plan  Health care maintenance  Well Exam - new patient     Pap testing - 10/17/23, ASCUS - sees GYN      Vaccines - tdap utd    Flu vaccine - utd     I recommend yearly flu vaccine and routine COVID vaccinations as indicated     Complete labs    Healthy diet, routine exercise was discussed with patient      Follow up in 1 year or sooner if needed      Orders:    POCT UA (nonautomated) manually resulted    Comprehensive Metabolic Panel; Future    Lipid Panel; Future    CBC and Auto Differential; Future    Hemoglobin A1C; Future    Gastroesophageal reflux disease, unspecified whether esophagitis present  On PPI - controlled        Anxiety  On zoloft - well controlled.  Pt requesting to switch care from psychiatry to me which is fine.    Orders:    sertraline (Zoloft) 100 mg tablet; Take 1 tablet (100 mg) by mouth once daily.    Unexplained night sweats  Referred to ENT.  Pt agreeable.   Orders:    TSH with reflex to Free T4 if abnormal; Future    Referral to Endocrinology; Future    Vitamin D deficiency    Orders:    Vitamin D 25-Hydroxy,Total (for eval of Vitamin D levels); Future

## 2024-10-25 NOTE — ASSESSMENT & PLAN NOTE
Referred to ENT.  Pt agreeable.   Orders:    TSH with reflex to Free T4 if abnormal; Future    Referral to Endocrinology; Future

## 2024-10-25 NOTE — ASSESSMENT & PLAN NOTE
On zoloft - well controlled.  Pt requesting to switch care from psychiatry to me which is fine.    Orders:    sertraline (Zoloft) 100 mg tablet; Take 1 tablet (100 mg) by mouth once daily.

## 2024-11-15 ENCOUNTER — APPOINTMENT (OUTPATIENT)
Dept: DERMATOLOGY | Facility: CLINIC | Age: 29
End: 2024-11-15
Payer: COMMERCIAL

## 2024-12-30 ENCOUNTER — LAB (OUTPATIENT)
Dept: LAB | Facility: LAB | Age: 29
End: 2024-12-30
Payer: COMMERCIAL

## 2024-12-30 DIAGNOSIS — R61 GENERALIZED HYPERHIDROSIS: Primary | ICD-10-CM

## 2024-12-30 DIAGNOSIS — Z00.00 ENCOUNTER FOR GENERAL ADULT MEDICAL EXAMINATION WITHOUT ABNORMAL FINDINGS: ICD-10-CM

## 2024-12-30 PROCEDURE — 83615 LACTATE (LD) (LDH) ENZYME: CPT

## 2024-12-31 LAB — LDH SERPL L TO P-CCNC: 128 U/L (ref 84–246)

## 2025-01-07 ENCOUNTER — APPOINTMENT (OUTPATIENT)
Dept: OBSTETRICS AND GYNECOLOGY | Facility: CLINIC | Age: 30
End: 2025-01-07
Payer: COMMERCIAL

## 2025-01-14 ENCOUNTER — APPOINTMENT (OUTPATIENT)
Dept: OBSTETRICS AND GYNECOLOGY | Facility: CLINIC | Age: 30
End: 2025-01-14
Payer: COMMERCIAL

## 2025-01-14 VITALS
HEIGHT: 68 IN | DIASTOLIC BLOOD PRESSURE: 84 MMHG | SYSTOLIC BLOOD PRESSURE: 120 MMHG | WEIGHT: 177 LBS | BODY MASS INDEX: 26.83 KG/M2

## 2025-01-14 DIAGNOSIS — Z01.419 WELL WOMAN EXAM: Primary | ICD-10-CM

## 2025-01-14 DIAGNOSIS — Z12.4 ENCOUNTER FOR PAPANICOLAOU SMEAR FOR CERVICAL CANCER SCREENING: ICD-10-CM

## 2025-01-14 DIAGNOSIS — Z30.011 BCP (BIRTH CONTROL PILLS) INITIATION: ICD-10-CM

## 2025-01-14 PROCEDURE — 87624 HPV HI-RISK TYP POOLED RSLT: CPT

## 2025-01-14 PROCEDURE — 99395 PREV VISIT EST AGE 18-39: CPT | Performed by: ADVANCED PRACTICE MIDWIFE

## 2025-01-14 PROCEDURE — 1036F TOBACCO NON-USER: CPT | Performed by: ADVANCED PRACTICE MIDWIFE

## 2025-01-14 PROCEDURE — 3008F BODY MASS INDEX DOCD: CPT | Performed by: ADVANCED PRACTICE MIDWIFE

## 2025-01-14 PROCEDURE — 88175 CYTOPATH C/V AUTO FLUID REDO: CPT

## 2025-01-14 RX ORDER — LEVONORGESTREL/ETHIN.ESTRADIOL 0.1-0.02MG
1 TABLET ORAL DAILY
Qty: 84 TABLET | Refills: 3 | Status: SHIPPED | OUTPATIENT
Start: 2025-01-14 | End: 2025-04-14

## 2025-01-14 SDOH — ECONOMIC STABILITY: FOOD INSECURITY: WITHIN THE PAST 12 MONTHS, YOU WORRIED THAT YOUR FOOD WOULD RUN OUT BEFORE YOU GOT MONEY TO BUY MORE.: NEVER TRUE

## 2025-01-14 SDOH — ECONOMIC STABILITY: INCOME INSECURITY: IN THE LAST 12 MONTHS, WAS THERE A TIME WHEN YOU WERE NOT ABLE TO PAY THE MORTGAGE OR RENT ON TIME?: NO

## 2025-01-14 SDOH — ECONOMIC STABILITY: FOOD INSECURITY: WITHIN THE PAST 12 MONTHS, THE FOOD YOU BOUGHT JUST DIDN'T LAST AND YOU DIDN'T HAVE MONEY TO GET MORE.: NEVER TRUE

## 2025-01-14 SDOH — ECONOMIC STABILITY: TRANSPORTATION INSECURITY
IN THE PAST 12 MONTHS, HAS THE LACK OF TRANSPORTATION KEPT YOU FROM MEDICAL APPOINTMENTS OR FROM GETTING MEDICATIONS?: NO

## 2025-01-14 SDOH — ECONOMIC STABILITY: TRANSPORTATION INSECURITY
IN THE PAST 12 MONTHS, HAS LACK OF TRANSPORTATION KEPT YOU FROM MEETINGS, WORK, OR FROM GETTING THINGS NEEDED FOR DAILY LIVING?: NO

## 2025-01-14 ASSESSMENT — SOCIAL DETERMINANTS OF HEALTH (SDOH)

## 2025-01-14 ASSESSMENT — PATIENT HEALTH QUESTIONNAIRE - PHQ9
2. FEELING DOWN, DEPRESSED OR HOPELESS: NOT AT ALL
SUM OF ALL RESPONSES TO PHQ9 QUESTIONS 1 AND 2: 0
1. LITTLE INTEREST OR PLEASURE IN DOING THINGS: NOT AT ALL

## 2025-01-14 NOTE — PROGRESS NOTES
"Assessment/Plan   Problem List Items Addressed This Visit       Well woman exam - Primary    Overview     History  Age of menarche: 18  Last pap: 2023 ASCUS POS  History of abnormal: multiple abnormals with colpo -- no LEEP  Mammogram: No  History of abnormal:  History of STI: HPV  Contraception: Pill -- will consider ring if pap abnormal    Sexual Health  Active with: Male  Pain: No  Lubrication: No  Orgasm: No    Family Cancer History  Breast: No  Ovarian: No  Endometrial: No  Colon: Yes - paternal grandmother age 50    Social  Occupation: Pharmacist @   Lives with: Fianceaimee  Alcohol < 7 drinks per week: Less  Tobacco/vaping:  No    Screening tests age 45 or greater  Colon cancer screening:    Calcium CT Scoring:  ASCVD scoring:  Serum screenings up to date:  DEXA screening:      Safety/Education  Feels safe in home: Yes  Has been forced in sexual activity in last year: No  Calcium/Vitamin D supplementation - Calcium 1,200mg supplement or 300mg dietary per day plus 5,000u per day Vitamin D 3  Importance of adequate sleep: Minimum of 6 hours per night for heart health  Stress reduction/mindfulness:  Working on it                   Other Visit Diagnoses       Encounter for Papanicolaou smear for cervical cancer screening        BCP (birth control pills) initiation                 Sabrina NOHEMI Padilla, APRN-CNM     Luis Weinberg Enmanuel \"Yakelin\" is a 29 y.o. female who is here for a routine exam. Periods are regular every 28-30 days, lasting 4 days. Dysmenorrhea:mild, occurring premenstrually and first 1-2 days of flow. Cyclic symptoms include irritability. No intermenstrual bleeding, spotting, or discharge.    ROS      /84   Ht 1.727 m (5' 8\")   Wt 80.3 kg (177 lb)   LMP 12/24/2024 (Approximate)   BMI 26.91 kg/m²     General:   Alert and oriented x 3   Heart:  Thyroid: Regular rate, rhythm  Euthyroid, normal shape and size   Lungs:  Breast: Clear to auscultation bilaterally  Symmetrical, no skin " changes/nipple discharge, redness, tenderness, no masses palpated bilaterally   Abdomen: Soft, non tender   Vulva: EGBUS normal   Vagina: Pink, normal discharge   Cervix: No CMT   Uterus: Normal shape, size   Adnexa: NT bilaterally       Thank you for coming to see me for your visit today and most importantly thank you for having a preventative visit.  If lab work was sent, you will see your test result via Lineagent  Any prescriptions will be sent electronically to your pharmacy listed    I look forward to seeing you next year for your health care needs.  Please remember:   Sleep is important - make it a priority for at least 6 hours per night!   Exercise such as walking for 20 minutes per day is beneficial to your physical and mental health   Healthy diets can be expensive -- if you every feel like you are struggling to afford healthy food, please let me know as we have a very good program called [employer] that is available to you   Decrease alcohol and tobacco.  A goal of less than 7 alcoholic drinks per week is recommended for risk reduction of breast and colon cancer by 38%!    Be well!  Deb

## 2025-01-29 LAB
CYTOLOGY CMNT CVX/VAG CYTO-IMP: NORMAL
HPV HR GENOTYPES PNL CVX NAA+PROBE: NEGATIVE
LAB AP CONTRACEPTIVE HISTORY: NORMAL
LAB AP HPV GENOTYPE QUESTION: NO
LAB AP HPV HR: NORMAL
LAB AP PREVIOUS ABNORMAL HISTORY: NORMAL
LABORATORY COMMENT REPORT: NORMAL
LMP START DATE: NORMAL
PATH REPORT.TOTAL CANCER: NORMAL

## 2025-05-02 PROCEDURE — 99284 EMERGENCY DEPT VISIT MOD MDM: CPT | Performed by: EMERGENCY MEDICINE

## 2025-05-03 ENCOUNTER — APPOINTMENT (OUTPATIENT)
Dept: RADIOLOGY | Facility: HOSPITAL | Age: 30
End: 2025-05-03
Payer: COMMERCIAL

## 2025-05-03 ENCOUNTER — HOSPITAL ENCOUNTER (EMERGENCY)
Facility: HOSPITAL | Age: 30
Discharge: HOME | End: 2025-05-03
Attending: EMERGENCY MEDICINE
Payer: COMMERCIAL

## 2025-05-03 VITALS
RESPIRATION RATE: 20 BRPM | SYSTOLIC BLOOD PRESSURE: 115 MMHG | DIASTOLIC BLOOD PRESSURE: 69 MMHG | OXYGEN SATURATION: 98 % | HEIGHT: 67 IN | TEMPERATURE: 97 F | BODY MASS INDEX: 26.68 KG/M2 | WEIGHT: 170 LBS | HEART RATE: 63 BPM

## 2025-05-03 DIAGNOSIS — R10.9 LEFT FLANK PAIN: Primary | ICD-10-CM

## 2025-05-03 LAB
ALBUMIN SERPL BCP-MCNC: 4.5 G/DL (ref 3.4–5)
ALP SERPL-CCNC: 40 U/L (ref 33–110)
ALT SERPL W P-5'-P-CCNC: 10 U/L (ref 7–45)
ANION GAP SERPL CALC-SCNC: 13 MMOL/L (ref 10–20)
APPEARANCE UR: ABNORMAL
AST SERPL W P-5'-P-CCNC: 13 U/L (ref 9–39)
B-HCG SERPL-ACNC: <2 MIU/ML
BASOPHILS # BLD AUTO: 0.04 X10*3/UL (ref 0–0.1)
BASOPHILS NFR BLD AUTO: 0.3 %
BILIRUB SERPL-MCNC: 0.4 MG/DL (ref 0–1.2)
BILIRUB UR STRIP.AUTO-MCNC: NEGATIVE MG/DL
BUN SERPL-MCNC: 11 MG/DL (ref 6–23)
CALCIUM SERPL-MCNC: 9.2 MG/DL (ref 8.6–10.3)
CHLORIDE SERPL-SCNC: 102 MMOL/L (ref 98–107)
CO2 SERPL-SCNC: 23 MMOL/L (ref 21–32)
COLOR UR: YELLOW
CREAT SERPL-MCNC: 0.8 MG/DL (ref 0.5–1.05)
EGFRCR SERPLBLD CKD-EPI 2021: >90 ML/MIN/1.73M*2
EOSINOPHIL # BLD AUTO: 0.03 X10*3/UL (ref 0–0.7)
EOSINOPHIL NFR BLD AUTO: 0.2 %
ERYTHROCYTE [DISTWIDTH] IN BLOOD BY AUTOMATED COUNT: 11.8 % (ref 11.5–14.5)
GLUCOSE SERPL-MCNC: 146 MG/DL (ref 74–99)
GLUCOSE UR STRIP.AUTO-MCNC: NORMAL MG/DL
HCT VFR BLD AUTO: 36.5 % (ref 36–46)
HGB BLD-MCNC: 12.4 G/DL (ref 12–16)
IMM GRANULOCYTES # BLD AUTO: 0.05 X10*3/UL (ref 0–0.7)
IMM GRANULOCYTES NFR BLD AUTO: 0.4 % (ref 0–0.9)
KETONES UR STRIP.AUTO-MCNC: NEGATIVE MG/DL
LEUKOCYTE ESTERASE UR QL STRIP.AUTO: NEGATIVE
LYMPHOCYTES # BLD AUTO: 1.23 X10*3/UL (ref 1.2–4.8)
LYMPHOCYTES NFR BLD AUTO: 8.9 %
MCH RBC QN AUTO: 29.3 PG (ref 26–34)
MCHC RBC AUTO-ENTMCNC: 34 G/DL (ref 32–36)
MCV RBC AUTO: 86 FL (ref 80–100)
MONOCYTES # BLD AUTO: 0.57 X10*3/UL (ref 0.1–1)
MONOCYTES NFR BLD AUTO: 4.1 %
MUCOUS THREADS #/AREA URNS AUTO: ABNORMAL /LPF
NEUTROPHILS # BLD AUTO: 11.86 X10*3/UL (ref 1.2–7.7)
NEUTROPHILS NFR BLD AUTO: 86.1 %
NITRITE UR QL STRIP.AUTO: NEGATIVE
NRBC BLD-RTO: 0 /100 WBCS (ref 0–0)
PH UR STRIP.AUTO: 6.5 [PH]
PLATELET # BLD AUTO: 330 X10*3/UL (ref 150–450)
POTASSIUM SERPL-SCNC: 3.6 MMOL/L (ref 3.5–5.3)
PROT SERPL-MCNC: 7 G/DL (ref 6.4–8.2)
PROT UR STRIP.AUTO-MCNC: ABNORMAL MG/DL
RBC # BLD AUTO: 4.23 X10*6/UL (ref 4–5.2)
RBC # UR STRIP.AUTO: ABNORMAL MG/DL
RBC #/AREA URNS AUTO: ABNORMAL /HPF
SODIUM SERPL-SCNC: 134 MMOL/L (ref 136–145)
SP GR UR STRIP.AUTO: 1.02
SQUAMOUS #/AREA URNS AUTO: ABNORMAL /HPF
UROBILINOGEN UR STRIP.AUTO-MCNC: NORMAL MG/DL
WBC # BLD AUTO: 13.8 X10*3/UL (ref 4.4–11.3)
WBC #/AREA URNS AUTO: ABNORMAL /HPF

## 2025-05-03 PROCEDURE — 96374 THER/PROPH/DIAG INJ IV PUSH: CPT

## 2025-05-03 PROCEDURE — 74176 CT ABD & PELVIS W/O CONTRAST: CPT

## 2025-05-03 PROCEDURE — 36415 COLL VENOUS BLD VENIPUNCTURE: CPT

## 2025-05-03 PROCEDURE — 74176 CT ABD & PELVIS W/O CONTRAST: CPT | Performed by: RADIOLOGY

## 2025-05-03 PROCEDURE — 84702 CHORIONIC GONADOTROPIN TEST: CPT

## 2025-05-03 PROCEDURE — 2500000004 HC RX 250 GENERAL PHARMACY W/ HCPCS (ALT 636 FOR OP/ED): Mod: JZ

## 2025-05-03 PROCEDURE — 85025 COMPLETE CBC W/AUTO DIFF WBC: CPT

## 2025-05-03 PROCEDURE — 81001 URINALYSIS AUTO W/SCOPE: CPT

## 2025-05-03 PROCEDURE — 80053 COMPREHEN METABOLIC PANEL: CPT

## 2025-05-03 RX ORDER — ONDANSETRON HYDROCHLORIDE 2 MG/ML
4 INJECTION, SOLUTION INTRAVENOUS ONCE
Status: COMPLETED | OUTPATIENT
Start: 2025-05-03 | End: 2025-05-03

## 2025-05-03 RX ORDER — MORPHINE SULFATE 4 MG/ML
4 INJECTION, SOLUTION INTRAMUSCULAR; INTRAVENOUS ONCE
Status: DISCONTINUED | OUTPATIENT
Start: 2025-05-03 | End: 2025-05-03 | Stop reason: HOSPADM

## 2025-05-03 RX ADMIN — ONDANSETRON 4 MG: 2 INJECTION INTRAMUSCULAR; INTRAVENOUS at 01:31

## 2025-05-03 ASSESSMENT — PAIN SCALES - GENERAL: PAINLEVEL_OUTOF10: 6

## 2025-05-03 ASSESSMENT — LIFESTYLE VARIABLES
HAVE YOU EVER FELT YOU SHOULD CUT DOWN ON YOUR DRINKING: NO
TOTAL SCORE: 0
EVER HAD A DRINK FIRST THING IN THE MORNING TO STEADY YOUR NERVES TO GET RID OF A HANGOVER: NO
EVER FELT BAD OR GUILTY ABOUT YOUR DRINKING: NO
HAVE PEOPLE ANNOYED YOU BY CRITICIZING YOUR DRINKING: NO

## 2025-05-03 ASSESSMENT — COLUMBIA-SUICIDE SEVERITY RATING SCALE - C-SSRS
1. IN THE PAST MONTH, HAVE YOU WISHED YOU WERE DEAD OR WISHED YOU COULD GO TO SLEEP AND NOT WAKE UP?: NO
6. HAVE YOU EVER DONE ANYTHING, STARTED TO DO ANYTHING, OR PREPARED TO DO ANYTHING TO END YOUR LIFE?: NO
2. HAVE YOU ACTUALLY HAD ANY THOUGHTS OF KILLING YOURSELF?: NO

## 2025-05-03 ASSESSMENT — PAIN - FUNCTIONAL ASSESSMENT: PAIN_FUNCTIONAL_ASSESSMENT: 0-10

## 2025-05-03 NOTE — ED PROVIDER NOTES
EMERGENCY DEPARTMENT ENCOUNTER      Pt Name: Sultana Singer  MRN: [id number]  Birthdate 1995  Date of evaluation: 5/2/2025  Provider: Jaime Soares DO    CHIEF COMPLAINT       Chief Complaint   Patient presents with    Flank Pain         HISTORY OF PRESENT ILLNESS    HPI    29-year-old female presenting to the emergency department for evaluation of left flank pain.  Patient states starting at 6 PM she had sudden onset sharp left flank pain with associated nausea and dry heaving but no vomiting.  Patient states the pain is always present however waxes and wanes in intensity.  Denies any hematuria, dysuria, polyuria, urgency.  No diarrhea.  Patient is sexually active with her fiancé.  Denies any vaginal spotting or bleeding.  No vaginal discharge.  Denies chest pain, shortness of breath, cough, nasal congestion and states she is otherwise not been ill.  Although triage complaints as right flank pain during my exam patient states that is her left flank that hurts and denies experiencing any right flank pain at any time.    Nursing Notes were reviewed.    PAST MEDICAL HISTORY   Medical History[1]      SURGICAL HISTORY     Surgical History[2]      CURRENT MEDICATIONS       Discharge Medication List as of 5/3/2025  4:30 AM        CONTINUE these medications which have NOT CHANGED    Details   levonorgestreL-ethinyl estrad (Larissia) 0.1-20 mg-mcg tablet Take 1 tablet by mouth once daily., Starting Tue 1/14/2025, Until Mon 4/14/2025, Normal      omeprazole (PriLOSEC) 20 mg DR capsule Omeprazole 20 MG Oral Capsule Delayed Release  Refills: 0  Start: 7-Sep-2021, Historical Med      sertraline (Zoloft) 100 mg tablet Take 1 tablet (100 mg) by mouth once daily., Starting Fri 10/25/2024, Normal      tacrolimus (Protopic) 0.1 % ointment Apply topically 2 times a day., Starting Sat 7/13/2024, Until Sun 7/13/2025, Normal             ALLERGIES     Nickel    FAMILY HISTORY     Family History[3]       SOCIAL HISTORY      Social History[4]    SCREENINGS                        PHYSICAL EXAM    (up to 7 for level 4, 8 or more for level 5)     ED Triage Vitals [05/03/25 0038]   Temperature Heart Rate Respirations BP   36.1 °C (97 °F) 69 20 130/77      Pulse Ox Temp Source Heart Rate Source Patient Position   98 % Temporal -- --      BP Location FiO2 (%)     -- --       Physical Exam  Vitals and nursing note reviewed.   Constitutional:       General: She is not in acute distress.     Appearance: Normal appearance. She is not ill-appearing, toxic-appearing or diaphoretic.   HENT:      Head: Normocephalic and atraumatic.      Right Ear: External ear normal.      Left Ear: External ear normal.      Nose: Nose normal.      Mouth/Throat:      Pharynx: Oropharynx is clear.   Eyes:      Conjunctiva/sclera: Conjunctivae normal.   Cardiovascular:      Rate and Rhythm: Normal rate and regular rhythm.      Pulses: Normal pulses.      Heart sounds: Normal heart sounds.   Pulmonary:      Effort: Pulmonary effort is normal.      Breath sounds: Normal breath sounds.   Abdominal:      General: There is no distension.      Palpations: There is no mass.      Tenderness: There is no abdominal tenderness. There is left CVA tenderness. There is no right CVA tenderness, guarding or rebound.      Hernia: No hernia is present.   Musculoskeletal:         General: Normal range of motion.      Cervical back: Normal range of motion and neck supple.      Right lower leg: No edema.      Left lower leg: No edema.   Skin:     General: Skin is warm and dry.   Neurological:      General: No focal deficit present.      Mental Status: She is oriented to person, place, and time.   Psychiatric:         Mood and Affect: Mood normal.         Behavior: Behavior normal.          DIAGNOSTIC RESULTS     LABS:  Labs Reviewed   CBC WITH AUTO DIFFERENTIAL - Abnormal       Result Value    WBC 13.8 (*)     nRBC 0.0      RBC 4.23      Hemoglobin 12.4      Hematocrit 36.5      MCV 86       MCH 29.3      MCHC 34.0      RDW 11.8      Platelets 330      Neutrophils % 86.1      Immature Granulocytes %, Automated 0.4      Lymphocytes % 8.9      Monocytes % 4.1      Eosinophils % 0.2      Basophils % 0.3      Neutrophils Absolute 11.86 (*)     Immature Granulocytes Absolute, Automated 0.05      Lymphocytes Absolute 1.23      Monocytes Absolute 0.57      Eosinophils Absolute 0.03      Basophils Absolute 0.04     COMPREHENSIVE METABOLIC PANEL - Abnormal    Glucose 146 (*)     Sodium 134 (*)     Potassium 3.6      Chloride 102      Bicarbonate 23      Anion Gap 13      Urea Nitrogen 11      Creatinine 0.80      eGFR >90      Calcium 9.2      Albumin 4.5      Alkaline Phosphatase 40      Total Protein 7.0      AST 13      Bilirubin, Total 0.4      ALT 10     URINALYSIS WITH REFLEX CULTURE AND MICROSCOPIC - Abnormal    Color, Urine Yellow      Appearance, Urine Turbid (*)     Specific Gravity, Urine 1.023      pH, Urine 6.5      Protein, Urine 10 (TRACE)      Glucose, Urine Normal      Blood, Urine 0.03 (TRACE) (*)     Ketones, Urine NEGATIVE      Bilirubin, Urine NEGATIVE      Urobilinogen, Urine Normal      Nitrite, Urine NEGATIVE      Leukocyte Esterase, Urine NEGATIVE     URINALYSIS MICROSCOPIC WITH REFLEX CULTURE - Abnormal    WBC, Urine 1-5      RBC, Urine 11-20 (*)     Squamous Epithelial Cells, Urine 1-9 (SPARSE)      Mucus, Urine FEW     HUMAN CHORIONIC GONADOTROPIN, SERUM QUANTITATIVE - Normal    HCG, Beta-Quantitative <2      Narrative:      Total HCG measurement is performed using the Bel[employer] Access   Immunoassay which detects intact HCG and free beta HCG subunit.    This test is not indicated for use as a tumor marker.   HCG testing is performed using a different test methodology at [facility] than other [facility]. Direct result comparison   should only be made within the same method.       URINALYSIS WITH REFLEX CULTURE AND MICROSCOPIC    Narrative:     The  following orders were created for panel order Urinalysis with Reflex Culture and Microscopic.  Procedure                               Abnormality         Status                     ---------                               -----------         ------                     Urinalysis with Reflex C...[[id number]]  Abnormal            Final result               Extra Urine Gray Tube[[id number]]                                                         Please view results for these tests on the individual orders.   EXTRA URINE GRAY TUBE       All other labs were within normal range or not returned as of this dictation.    Imaging  CT abdomen pelvis wo IV contrast   Final Result   3.3 cm indeterminate mass in the right hepatic lobe; follow-up   outpatient liver protocol MRI is recommended for further evaluation.        No evidence of renal calculus or hydronephrosis.        2.6 cm left ovarian cyst.        MACRO:   None        Signed by: Sridhar Cruz 5/3/2025 3:53 AM   Dictation workstation:   [id number]           Procedures  Procedures     EMERGENCY DEPARTMENT COURSE/MDM:     Diagnoses as of 05/03/25 0641   Left flank pain        Medical Decision Making    29-year-old female presenting to the emergency department for evaluation of left flank pain.  Hemodynamically stable, no acute distress, nontoxic-appearing, afebrile.  High clinical suspicion for left-sided obstructive kidney stone based off of history physical exam.  Given CVA tenderness could also have superimposed pyelonephritis.  Zofran ordered for nausea as well as morphine for pain however patient's pain resolved prior to receiving morphine and patient declined pain medication.  CBC, CMP, urinalysis and hCG ordered as well as CT abdomen pelvis without IV contrast.  Patient has a leukocytosis with a white blood cell count of 13.8 with microscopic hematuria on urinalysis but no white blood cells, nitrates or LE to suggest UTI. Labs otherwise unremarkable for acute  pathology. CT shows incidental indeterminate mass in the right hepatic lobe as well as a 2.6 cm left ovarian cyst.  No evidence of obstructing kidney stone or other acute pathology.  Patient was reassessed and states she has only mild residual dull pain in her left flank and has not had any recurrence of the severe sharp pain that she experienced prior to arriving to the emergency department.  Given patient's symptoms with microscopic hematuria on urinalysis patient likely passed a small kidney stone prior to CT imaging.  Patient deemed safe for discharge for outpatient follow-up with her primary care physician with strict return precautions.  A referral for the [facility] was placed for follow-up regarding the patient's incidental hepatic mass the patient's incidental CT findings were discussed with her at bedside.    Patient and or family in agreement and understanding of treatment plan.  All questions answered.      I reviewed the case with the attending ED physician. The attending ED physician agrees with the plan. Patient and/or patient´s representative was counseled regarding labs, imaging, likely diagnosis, and plan. All questions were answered.    ED Medications administered this visit:    Medications   ondansetron (Zofran) injection 4 mg (4 mg intravenous Given 5/3/25 0131)       New Prescriptions from this visit:    Discharge Medication List as of 5/3/2025  4:30 AM          Follow-up:  Onur Farfan,   [address]  [address]  [facility] [id number]  [phone]    Call in 1 day      [facility]  [address]  [address]  [facility] [address] [phone]  Call in 1 day          Final Impression:   1. Left flank pain          (Please note that portions of this note were completed with a voice recognition program.  Efforts were made to edit the dictations but occasionally words are mis-transcribed.)       [1]   Past Medical History:  Diagnosis Date    Personal  history of other diseases of the digestive system     History of gastrointestinal disorder    Personal history of other mental and behavioral disorders     History of depression   [2]   Past Surgical History:  Procedure Laterality Date    OTHER SURGICAL HISTORY  09/07/2021    Finger surgical procedure    TONSILLECTOMY     [3]   Family History  Problem Relation Name Age of Onset    No Known Problems Mother      Atrial fibrillation Father      Hypertension Father      Hyperlipidemia Father      Colon cancer Paternal Grandmother      Other (cva) Other grandparent     Diabetes Other grandparent     Hypertension Other grandparent    [4]   Social History  Socioeconomic History    Marital status: Significant Other     Spouse name: Skinny   Occupational History    Occupation: pharmacist,  - fertility -    Tobacco Use    Smoking status: Never    Smokeless tobacco: Never   Vaping Use    Vaping status: Never Used   Substance and Sexual Activity    Alcohol use: Yes     Alcohol/week: 3.0 standard drinks of alcohol     Types: 3 Standard drinks or equivalent per week    Drug use: Never    Sexual activity: Yes     Partners: Male     Birth control/protection: I.U.D.     Social Drivers of Health     Food Insecurity: No Food Insecurity (1/14/2025)    Hunger Vital Sign     Worried About Running Out of Food in the Last Year: Never true     Ran Out of Food in the Last Year: Never true   Transportation Needs: No Transportation Needs (1/14/2025)    PRAPARE - Transportation     Lack of Transportation (Medical): No     Lack of Transportation (Non-Medical): No   Intimate Partner Violence: Not At Risk (1/14/2025)    Humiliation, Afraid, Rape, and Kick questionnaire     Fear of Current or Ex-Partner: No     Emotionally Abused: No     Physically Abused: No     Sexually Abused: No   Housing Stability: Low Risk  (1/14/2025)    Housing Stability Vital Sign     Unable to Pay for Housing in the Last Year: No     Number of Times Moved in the Last  Year: 1     Homeless in the Last Year: No        Jaime Soares DO  Resident  05/03/25 0636

## 2025-05-03 NOTE — DISCHARGE INSTRUCTIONS
Call to schedule follow-up appointment with your primary care physician for reevaluation.  You will also be provided with a referral to the [facility] for diagnostics for follow-up regarding your mass seen on your liver.  Return to the emergency department immediately for any new or worsening symptoms such as fevers, chills, night sweats, worsening pain, burning when you pee, blood in your urine, nausea, vomiting, diarrhea, chest pain or difficulty breathing.

## 2025-05-05 ENCOUNTER — TELEPHONE (OUTPATIENT)
Dept: HEMATOLOGY/ONCOLOGY | Facility: HOSPITAL | Age: 30
End: 2025-05-05
Payer: COMMERCIAL

## 2025-05-05 NOTE — TELEPHONE ENCOUNTER
Referral placed to [facility] by Dr. Cooley from the [place] ED for a 3.3cm right hepatic lob mass, discovered incidentally on 5/3/25 CT imaging. I called the patient and left a voicemail with our contact information. I would advise a liver MRI and virtual visit with our diagnostic clinic after to review results and next steps. Will place orders and discuss with patient once she returns our call.     Syeda TOMLIN-CNP  [facility]

## 2025-05-06 DIAGNOSIS — R16.0 LIVER MASS: Primary | ICD-10-CM

## 2025-05-06 NOTE — TELEPHONE ENCOUNTER
Pt called and agreeable to plan of getting MRI and following up with Diagnostic Clinic.  will reach out to pt for the MRI scheduling. Pt aware of this plan.

## 2025-05-18 ENCOUNTER — APPOINTMENT (OUTPATIENT)
Dept: RADIOLOGY | Facility: HOSPITAL | Age: 30
End: 2025-05-18
Payer: COMMERCIAL

## 2025-05-20 ENCOUNTER — APPOINTMENT (OUTPATIENT)
Dept: HEMATOLOGY/ONCOLOGY | Facility: HOSPITAL | Age: 30
End: 2025-05-20
Payer: COMMERCIAL

## 2025-05-25 ENCOUNTER — APPOINTMENT (OUTPATIENT)
Dept: RADIOLOGY | Facility: HOSPITAL | Age: 30
End: 2025-05-25
Payer: COMMERCIAL

## 2025-05-28 ENCOUNTER — HOSPITAL ENCOUNTER (OUTPATIENT)
Dept: RADIOLOGY | Facility: CLINIC | Age: 30
Discharge: HOME | End: 2025-05-28
Payer: COMMERCIAL

## 2025-05-28 VITALS — WEIGHT: 176.37 LBS | BODY MASS INDEX: 27.62 KG/M2

## 2025-05-28 DIAGNOSIS — R16.0 LIVER MASS: ICD-10-CM

## 2025-05-28 PROCEDURE — 2500000004 HC RX 250 GENERAL PHARMACY W/ HCPCS (ALT 636 FOR OP/ED): Performed by: NURSE PRACTITIONER

## 2025-05-28 PROCEDURE — A9581 GADOXETATE DISODIUM INJ: HCPCS | Performed by: NURSE PRACTITIONER

## 2025-05-28 PROCEDURE — 74183 MRI ABD W/O CNTR FLWD CNTR: CPT

## 2025-05-28 RX ADMIN — GADOXETATE DISODIUM 2 MMOL: 181.43 INJECTION, SOLUTION INTRAVENOUS at 13:54

## 2025-06-04 ENCOUNTER — TELEMEDICINE (OUTPATIENT)
Dept: HEMATOLOGY/ONCOLOGY | Facility: HOSPITAL | Age: 30
End: 2025-06-04
Payer: COMMERCIAL

## 2025-06-04 DIAGNOSIS — R10.9 LEFT FLANK PAIN: ICD-10-CM

## 2025-06-04 PROBLEM — N28.1 RENAL CYST, LEFT: Status: ACTIVE | Noted: 2025-06-04

## 2025-06-04 PROBLEM — D18.03 HEMANGIOMA OF LIVER: Status: ACTIVE | Noted: 2025-06-04

## 2025-06-04 PROCEDURE — 99203 OFFICE O/P NEW LOW 30 MIN: CPT | Performed by: NURSE PRACTITIONER

## 2025-06-04 NOTE — PROGRESS NOTES
[facility]  Diagnostic Clinic  New Patient Visit    Date of Service: 25      Patient Name: Sultana Singer   : 1995  MRN: [id number]   PCP: Onur Farfan DO   Referred by: Dr. Cooley    Problem: 3.3cm right hepatic lobe mass on 5/3/25 CT scan.    HPI: Sultana Singer is a 29 y.o. year old female who presents to [facility] for follow-up on an hepatic mass seen on 5/3/25 CT scan after presenting to the [facility] ED with left flank pain, nausea, and dry heaving. Was found to have 2.6cm left ovarian cyst, hematuria, and leukocytosis possibly attributed to the passing of a small kidney stone.     Today reports resolution of her left flank pain and feels at her baseline health.    Denies fevers, chills, abdominal pain, nausea, vomiting, hematuria, dysuria, changes in appetite, unintentional weightloss.    History of tobacco use:   Denies tobacco and illicit drug use  History of alcohol substance use disorder, stopped drinking in 2025    Personal history of malignancy:   None    Family history of malignancy:   Paternal grandmother - Colon cancer, age 50  Maternal grandmother - Non-Hodgkin Lymphoma, initially diagnosed     Health Maintenance:  Pap Smear 2025 - normal  PCP yearly exam 10/2025    PATHOLOGY:  N/A     IMAGING:  === 25 ===    MR LIVER WITH EOVIST CONTRAST    - Impression -  1.  Hepatic hemangioma corresponding with the CT finding.  2. Additional findings as described above.    I personally reviewed the images/study and I agree with the findings  as stated by Franklin Askew MD (resident) . This study was  interpreted at [place], [place].    MACRO:  None    Signed by: Gregorio Jimenez 2025 7:31 PM  Dictation workstation:   [id number]  === 25 ===    CT ABDOMEN PELVIS WO IV CONTRAST    - Impression -  3.3 cm indeterminate mass in the right hepatic lobe; follow-up  outpatient  liver protocol MRI is recommended for further evaluation.    No evidence of renal calculus or hydronephrosis.    2.6 cm left ovarian cyst.    MACRO:  None    Signed by: Sridhar Cruz 5/3/2025 3:53 AM  Dictation workstation:   [id number]      LABS:  Reviewed labs from 5/3/25    PAST MEDICAL HISTORY:  Medical History[1]    PAST SURGICAL HISTORY:  Surgical History[2]    SOCIAL HISTORY:  Social Connections: Not on file       FAMILY HISTORY:  Family History[3]    MEDICATIONS:  Medications Ordered Prior to Encounter[4]      OBJECTIVE:  There were no vitals taken for this visit.      ASSESSMENT:  Ms. Singer is a 30yo female referred to the [facility] by the [facility] for a liver mass seen on 5/3/25 CT. MRI of the liver on 5/28/25 confirmed mass to be benign liver hemangioma. Left renal cyst incidentally found on MRI. Offered urology follow-up for both renal cyst and recent kidney stone, but patient declined at this time.     PLAN:  No additional work-up needed for liver hemangioma at this time given that patient is asymptomatic.     Continue to follow with PCP and Gyn for health maintenance.     Syeda TOMLIN-ANNI  [facility]    Virtual or Telephone Consent    An interactive audio and video telecommunication system which permits real time communications between the patient (at the originating site) and provider (at the distant site) was utilized to provide this telehealth service.   Verbal consent was requested and obtained from Sultana Singer on this date, 06/04/25 for a telehealth visit and the patient's location was confirmed at the time of the visit.                 [1]   Past Medical History:  Diagnosis Date    Personal history of other diseases of the digestive system     History of gastrointestinal disorder    Personal history of other mental and behavioral disorders     History of depression   [2]   Past Surgical History:  Procedure Laterality Date    OTHER SURGICAL HISTORY   09/07/2021    Finger surgical procedure    TONSILLECTOMY     [3]   Family History  Problem Relation Name Age of Onset    No Known Problems Mother      Atrial fibrillation Father      Hypertension Father      Hyperlipidemia Father      Colon cancer Paternal Grandmother      Other (cva) Other grandparent     Diabetes Other grandparent     Hypertension Other grandparent    [4]   Current Outpatient Medications on File Prior to Visit   Medication Sig Dispense Refill    levonorgestreL-ethinyl estrad (Larissia) 0.1-20 mg-mcg tablet Take 1 tablet by mouth once daily. 84 tablet 3    omeprazole (PriLOSEC) 20 mg DR capsule Omeprazole 20 MG Oral Capsule Delayed Release  Refills: 0  Start: 7-Sep-2021      sertraline (Zoloft) 100 mg tablet Take 1 tablet (100 mg) by mouth once daily. 90 tablet 3    tacrolimus (Protopic) 0.1 % ointment Apply topically 2 times a day. 60 g 11     No current facility-administered medications on file prior to visit.

## 2025-06-11 ENCOUNTER — SPECIALTY PHARMACY (OUTPATIENT)
Dept: PHARMACY | Facility: CLINIC | Age: 30
End: 2025-06-11

## 2025-06-12 ENCOUNTER — SPECIALTY PHARMACY (OUTPATIENT)
Dept: PHARMACY | Facility: CLINIC | Age: 30
End: 2025-06-12

## 2025-06-12 PROCEDURE — RXMED WILLOW AMBULATORY MEDICATION CHARGE

## 2025-06-16 ENCOUNTER — PHARMACY VISIT (OUTPATIENT)
Dept: PHARMACY | Facility: CLINIC | Age: 30
End: 2025-06-16
Payer: COMMERCIAL

## 2025-11-04 ENCOUNTER — APPOINTMENT (OUTPATIENT)
Facility: CLINIC | Age: 30
End: 2025-11-04
Payer: COMMERCIAL